# Patient Record
Sex: MALE | Race: WHITE
[De-identification: names, ages, dates, MRNs, and addresses within clinical notes are randomized per-mention and may not be internally consistent; named-entity substitution may affect disease eponyms.]

---

## 2019-04-21 ENCOUNTER — HOSPITAL ENCOUNTER (EMERGENCY)
Dept: HOSPITAL 8 - ED | Age: 54
Discharge: HOME | End: 2019-04-21
Payer: MEDICARE

## 2019-04-21 ENCOUNTER — HOSPITAL ENCOUNTER (EMERGENCY)
Dept: HOSPITAL 8 - ED | Age: 54
Discharge: HOME | End: 2019-04-21
Payer: SELF-PAY

## 2019-04-21 VITALS — WEIGHT: 216.05 LBS | HEIGHT: 60 IN | BODY MASS INDEX: 42.42 KG/M2

## 2019-04-21 VITALS — DIASTOLIC BLOOD PRESSURE: 64 MMHG | SYSTOLIC BLOOD PRESSURE: 128 MMHG

## 2019-04-21 DIAGNOSIS — S02.32XA: ICD-10-CM
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
## 2019-04-21 DIAGNOSIS — S01.81XA: Primary | ICD-10-CM
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
## 2019-04-21 DIAGNOSIS — Z02.9: Primary | ICD-10-CM
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
## 2019-04-21 DIAGNOSIS — Z89.612: ICD-10-CM

## 2019-04-21 PROCEDURE — 12052 INTMD RPR FACE/MM 2.6-5.0 CM: CPT

## 2019-04-21 PROCEDURE — 70486 CT MAXILLOFACIAL W/O DYE: CPT

## 2019-04-21 PROCEDURE — 96372 THER/PROPH/DIAG INJ SC/IM: CPT

## 2019-04-21 PROCEDURE — 73060 X-RAY EXAM OF HUMERUS: CPT

## 2019-04-21 PROCEDURE — 99284 EMERGENCY DEPT VISIT MOD MDM: CPT

## 2019-04-21 PROCEDURE — 70450 CT HEAD/BRAIN W/O DYE: CPT

## 2019-04-21 PROCEDURE — 73030 X-RAY EXAM OF SHOULDER: CPT
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
## 2019-04-21 NOTE — NUR
STILL AWAITING SUE. PT RESTING COMFORTABLY IN BED WITH FAMILY AT BEDSIDE. 
TALKING AND LAUGHING, SITTING UP IN BED. CALL LIGHT WITHIN REACH, WILL CONTINUE 
TO MONITOR.